# Patient Record
Sex: MALE | Race: WHITE | Employment: FULL TIME | URBAN - METROPOLITAN AREA
[De-identification: names, ages, dates, MRNs, and addresses within clinical notes are randomized per-mention and may not be internally consistent; named-entity substitution may affect disease eponyms.]

---

## 2024-11-06 ENCOUNTER — HOSPITAL ENCOUNTER (EMERGENCY)
Facility: HOSPITAL | Age: 34
Discharge: HOME | End: 2024-11-06
Attending: STUDENT IN AN ORGANIZED HEALTH CARE EDUCATION/TRAINING PROGRAM
Payer: MEDICAID

## 2024-11-06 VITALS
RESPIRATION RATE: 18 BRPM | WEIGHT: 165 LBS | TEMPERATURE: 98 F | SYSTOLIC BLOOD PRESSURE: 135 MMHG | DIASTOLIC BLOOD PRESSURE: 84 MMHG | BODY MASS INDEX: 25.01 KG/M2 | HEART RATE: 99 BPM | HEIGHT: 68 IN | OXYGEN SATURATION: 98 %

## 2024-11-06 DIAGNOSIS — K42.0 UMBILICAL HERNIA WITH OBSTRUCTION, WITHOUT GANGRENE: Primary | ICD-10-CM

## 2024-11-06 LAB
ALBUMIN SERPL-MCNC: 4.3 G/DL (ref 3.5–5)
ALBUMIN/GLOB SERPL: 1.3 RATIO (ref 1.1–2)
ALP SERPL-CCNC: 83 UNIT/L (ref 40–150)
ALT SERPL-CCNC: 19 UNIT/L (ref 0–55)
ANION GAP SERPL CALC-SCNC: 7 MEQ/L
AST SERPL-CCNC: 22 UNIT/L (ref 5–34)
BASOPHILS # BLD AUTO: 0.04 X10(3)/MCL
BASOPHILS NFR BLD AUTO: 0.5 %
BILIRUB SERPL-MCNC: 0.4 MG/DL
BUN SERPL-MCNC: 7 MG/DL (ref 8.9–20.6)
CALCIUM SERPL-MCNC: 9.6 MG/DL (ref 8.4–10.2)
CHLORIDE SERPL-SCNC: 103 MMOL/L (ref 98–107)
CO2 SERPL-SCNC: 28 MMOL/L (ref 22–29)
CREAT SERPL-MCNC: 0.93 MG/DL (ref 0.72–1.25)
CREAT/UREA NIT SERPL: 8
EOSINOPHIL # BLD AUTO: 0.04 X10(3)/MCL (ref 0–0.9)
EOSINOPHIL NFR BLD AUTO: 0.5 %
ERYTHROCYTE [DISTWIDTH] IN BLOOD BY AUTOMATED COUNT: 13.6 % (ref 11.5–17)
GFR SERPLBLD CREATININE-BSD FMLA CKD-EPI: >60 ML/MIN/1.73/M2
GLOBULIN SER-MCNC: 3.2 GM/DL (ref 2.4–3.5)
GLUCOSE SERPL-MCNC: 92 MG/DL (ref 74–100)
HCT VFR BLD AUTO: 45.4 % (ref 42–52)
HGB BLD-MCNC: 15 G/DL (ref 14–18)
IMM GRANULOCYTES # BLD AUTO: 0.01 X10(3)/MCL (ref 0–0.04)
IMM GRANULOCYTES NFR BLD AUTO: 0.1 %
LYMPHOCYTES # BLD AUTO: 1.47 X10(3)/MCL (ref 0.6–4.6)
LYMPHOCYTES NFR BLD AUTO: 17.9 %
MCH RBC QN AUTO: 31.4 PG (ref 27–31)
MCHC RBC AUTO-ENTMCNC: 33 G/DL (ref 33–36)
MCV RBC AUTO: 95 FL (ref 80–94)
MONOCYTES # BLD AUTO: 0.6 X10(3)/MCL (ref 0.1–1.3)
MONOCYTES NFR BLD AUTO: 7.3 %
NEUTROPHILS # BLD AUTO: 6.06 X10(3)/MCL (ref 2.1–9.2)
NEUTROPHILS NFR BLD AUTO: 73.7 %
PLATELET # BLD AUTO: 378 X10(3)/MCL (ref 130–400)
PMV BLD AUTO: 10.1 FL (ref 7.4–10.4)
POTASSIUM SERPL-SCNC: 4.2 MMOL/L (ref 3.5–5.1)
PROT SERPL-MCNC: 7.5 GM/DL (ref 6.4–8.3)
RBC # BLD AUTO: 4.78 X10(6)/MCL (ref 4.7–6.1)
SODIUM SERPL-SCNC: 138 MMOL/L (ref 136–145)
WBC # BLD AUTO: 8.22 X10(3)/MCL (ref 4.5–11.5)

## 2024-11-06 PROCEDURE — 25500020 PHARM REV CODE 255: Performed by: STUDENT IN AN ORGANIZED HEALTH CARE EDUCATION/TRAINING PROGRAM

## 2024-11-06 PROCEDURE — 85025 COMPLETE CBC W/AUTO DIFF WBC: CPT | Performed by: STUDENT IN AN ORGANIZED HEALTH CARE EDUCATION/TRAINING PROGRAM

## 2024-11-06 PROCEDURE — 80053 COMPREHEN METABOLIC PANEL: CPT | Performed by: STUDENT IN AN ORGANIZED HEALTH CARE EDUCATION/TRAINING PROGRAM

## 2024-11-06 PROCEDURE — 99285 EMERGENCY DEPT VISIT HI MDM: CPT | Mod: 25

## 2024-11-06 RX ADMIN — IOHEXOL 75 ML: 350 INJECTION, SOLUTION INTRAVENOUS at 12:11

## 2024-11-06 NOTE — Clinical Note
"Ashleecherwalter Silvestre (Zach) was seen and treated in our emergency department on 11/6/2024.  He may return to work on 11/07/2024.  Patient presented to the emergency department for evaluation of umbilical hernia.  On arrival umbilical hernia has been reduced.  Patient having no abdominal pain.  Laboratories, CT imaging negative for any acute emergent findings.  Patient is cleared to return back to work.  Strict return precautions given symptoms worsen, change return to ED. Patient encouraged to use abdominal binder when lifting and to avoid straining.     If you have any questions or concerns, please don't hesitate to call.      Benjamin Pal MD"
details…

## 2024-11-06 NOTE — ED PROVIDER NOTES
Encounter Date: 11/6/2024       History     Chief Complaint   Patient presents with    Abdominal Pain     Umbilical hernia     HPI  Patient was a 34-year-old male no significant past medical history presents to ER complaining of umbilical hernia.  Patient states the umbilical hernia popped out yesterday however patient was able to reduce the hernia.  Patient states since then he has been having any soreness to the umbilical area.  He denies any prior abdominal surgeries.  Denies any nausea, vomiting, dark or bloody stools  Review of patient's allergies indicates:  No Known Allergies  History reviewed. No pertinent past medical history.  History reviewed. No pertinent surgical history.  No family history on file.  Social History     Tobacco Use    Smoking status: Unknown     Review of Systems   Constitutional:  Negative for fever.   HENT:  Negative for sore throat.    Eyes:  Negative for visual disturbance.   Respiratory:  Negative for shortness of breath.    Cardiovascular:  Negative for chest pain.   Gastrointestinal:  Positive for abdominal pain. Negative for nausea.   Endocrine: Negative for polyuria.   Genitourinary:  Negative for dysuria.   Musculoskeletal:  Negative for back pain.   Skin:  Negative for rash.   Neurological:  Negative for weakness.   Hematological:  Does not bruise/bleed easily.   All other systems reviewed and are negative.      Physical Exam     Initial Vitals [11/06/24 1039]   BP Pulse Resp Temp SpO2   135/84 99 18 98.3 °F (36.8 °C) 98 %      MAP       --         Physical Exam    Nursing note and vitals reviewed.  Constitutional: Vital signs are normal. He appears well-developed and well-nourished. He is not diaphoretic. He is active.  Non-toxic appearance. He does not appear ill. No distress.   HENT:   Head: Normocephalic and atraumatic.   Eyes: Conjunctivae are normal. Pupils are equal, round, and reactive to light. Right conjunctiva is not injected. Left conjunctiva is not injected.    Neck: Trachea normal. Neck supple.   Normal range of motion.   Full passive range of motion without pain.     Cardiovascular:  Normal rate, regular rhythm, S1 normal, S2 normal, intact distal pulses and normal pulses.           Pulmonary/Chest: Breath sounds normal. No respiratory distress. He has no wheezes.   Abdominal: Abdomen is soft. Bowel sounds are normal. There is abdominal tenderness.   Mildly reproducible abdominal pain to the umbilicus however no rebound, no guarding, no overlying skin changes.  No ecchymosis.  Normoactive bowel sounds. There is no rebound and no guarding.   Musculoskeletal:         General: No tenderness or edema. Normal range of motion.      Cervical back: Full passive range of motion without pain, normal range of motion and neck supple. No rigidity.      Right lower leg: No swelling. No edema.      Left lower leg: No swelling. No edema.     Neurological: He is alert and oriented to person, place, and time.   Skin: Skin is warm and dry. Capillary refill takes less than 2 seconds.         ED Course   Procedures  Labs Reviewed   COMPREHENSIVE METABOLIC PANEL - Abnormal       Result Value    Sodium 138      Potassium 4.2      Chloride 103      CO2 28      Glucose 92      Blood Urea Nitrogen 7.0 (*)     Creatinine 0.93      Calcium 9.6      Protein Total 7.5      Albumin 4.3      Globulin 3.2      Albumin/Globulin Ratio 1.3      Bilirubin Total 0.4      ALP 83      ALT 19      AST 22      eGFR >60      Anion Gap 7.0      BUN/Creatinine Ratio 8     CBC WITH DIFFERENTIAL - Abnormal    WBC 8.22      RBC 4.78      Hgb 15.0      Hct 45.4      MCV 95.0 (*)     MCH 31.4 (*)     MCHC 33.0      RDW 13.6      Platelet 378      MPV 10.1      Neut % 73.7      Lymph % 17.9      Mono % 7.3      Eos % 0.5      Basophil % 0.5      Lymph # 1.47      Neut # 6.06      Mono # 0.60      Eos # 0.04      Baso # 0.04      IG# 0.01      IG% 0.1     CBC W/ AUTO DIFFERENTIAL    Narrative:     The following orders  were created for panel order CBC auto differential.  Procedure                               Abnormality         Status                     ---------                               -----------         ------                     CBC with Differential[[id number]]       Abnormal            Final result                 Please view results for these tests on the individual orders.          Imaging Results               CT Abdomen Pelvis With IV Contrast NO Oral Contrast (Final result)  Result time 11/06/24 12:50:09      Final result by Gordon Stevenson MD (11/06/24 12:50:09)                   Impression:      Findings consistent with a small-bowel intussusception involving the mid jejunum.  It is causing secondary obstructive changes.    Fecal impaction in the rectum and sigmoid    This report was flagged in Epic as abnormal.      Electronically signed by: Henri Stevenson  Date:    11/06/2024  Time:    12:50               Narrative:    EXAMINATION:  CT ABDOMEN PELVIS WITH IV CONTRAST    CLINICAL HISTORY:  Patient complaining of pain around umbilicus.  States umbilical hernia was reduced yesterday.  Denies any nausea, vomiting, fever.;    TECHNIQUE:  Low dose axial images, sagittal and coronal reformations were obtained from the lung bases to the pubic symphysis following the IV administration of contrast. Automatic exposure control (AEC) is utilized to reduce patient radiation exposure.    COMPARISON:  None.    FINDINGS:  There is mild bibasilar atelectasis.    The liver appears normal.  No liver mass or lesion is seen.  Portal and hepatic veins appear normal.    The gallbladder appears normal.  No obvious gallstones are seen.  No biliary dilatation is seen.  No pericholecystic fluid is seen.    The pancreas appears normal.  No pancreatic mass or lesion is seen.    The spleen shows no acute abnormality.    The adrenal glands appear normal.  No adrenal nodule is seen.    The kidneys appear normal.  No hydronephrosis  "is seen.  No hydroureter is seen.  No nephrolithiasis is seen.  No obvious ureteral stones are seen.    Urinary bladder appears grossly unremarkable.    The there are dilated loops of small bowel seen proximally.  There appears to be a intussusception in the small bowel in the left lower quadrant involving the jejunum.  This is seen on images number 46 through 55 series 3.  There are dilated fluid-filled loops of bowel seen proximal to it consistent with obstruction.    There is significant amount of stool seen in the colon consistent with fecal impaction in the sigmoid and rectum.  A minimal umbilical hernia seen containing some fat.                                       Medications   iohexoL (OMNIPAQUE 350) injection 75 mL (75 mLs Intravenous Given 11/6/24 1228)     Medical Decision Making  Patient was a 34-year-old male no significant past medical history presents to ER complaining of umbilical hernia.  Patient states the umbilical hernia "popped out yesterday" however patient was able to reduce the hernia.    Differential diagnosis includes but not limited to:  Incarcerated hernia, strangulated hernia, perforated viscus, reducible hernia, SBO, bowel spasm    Patient laboratories returned negative for any gross abnormalities.  CT imaging read by radiology negative for any acute intra-abdominal pathology other than intussusception however patient denies any abdominal pain, nausea, vomiting at this time.  I do not think this has any clinical relevance at this time.  Patient's abdomen remained soft, nontender.  I believe patient can be discharge.  Will discharge with general surgery referral.  Patient encouraged to follow with his PCP in next 2-3 days.  Patient stable for discharge.  Strict return precautions given.    Benjamin Pal M.D.  Emergency Medicine        Amount and/or Complexity of Data Reviewed  Labs: ordered.  Radiology: ordered.    Risk  Prescription drug management.                              "         Clinical Impression:  Final diagnoses:  [K42.0] Umbilical hernia with obstruction, without gangrene (Primary)                 Benjamin Pal MD  11/06/24 7740       Benjamin Pal MD  11/06/24 2834

## 2024-11-06 NOTE — DISCHARGE INSTRUCTIONS
New Results - Labs    Updated   Order    11/06/24 1215  Comprehensive metabolic panel  Collected: 11/06/24 1154  Final result  Specimen: Blood     Sodium 138 mmol/L   Potassium 4.2 mmol/L   Chloride 103 mmol/L   CO2 28 mmol/L   Glucose 92 mg/dL   Blood Urea Nitrogen 7.0 Low  mg/dL   Creatinine 0.93 mg/dL   Calcium 9.6 mg/dL   Protein Total 7.5 gm/dL   Albumin 4.3 g/dL   Globulin 3.2 gm/dL   Albumin/Globulin Ratio 1.3 ratio   Bilirubin Total 0.4 mg/dL   ALP 83 unit/L   ALT 19 unit/L   AST 22 unit/L   eGFR >60 mL/min/1.73/m2   Anion Gap 7.0 mEq/L   BUN/Creatinine Ratio 8          11/06/24 1201  CBC auto differential  Collected: 11/06/24 1154  Final result  Specimen: Blood            11/06/24 1201  CBC with Differential  Collected: 11/06/24 1154  Final result  Specimen: Blood     WBC 8.22 x10(3)/mcL   RBC 4.78 x10(6)/mcL   Hgb 15.0 g/dL   Hct 45.4 %   MCV 95.0 High  fL   MCH 31.4 High  pg   MCHC 33.0 g/dL   RDW 13.6 %   Platelet 378 x10(3)/mcL   MPV 10.1 fL   Neut % 73.7 %   Lymph % 17.9 %   Mono % 7.3 %   Eos % 0.5 %   Basophil % 0.5 %   Lymph # 1.47 x10(3)/mcL   Neut # 6.06 x10(3)/mcL   Mono # 0.60 x10(3)/mcL   Eos # 0.04 x10(3)/mcL   Baso # 0.04 x10(3)/mcL   IG# 0.01 x10(3)/mcL   IG% 0.1 %